# Patient Record
Sex: FEMALE | Race: BLACK OR AFRICAN AMERICAN | NOT HISPANIC OR LATINO | URBAN - METROPOLITAN AREA
[De-identification: names, ages, dates, MRNs, and addresses within clinical notes are randomized per-mention and may not be internally consistent; named-entity substitution may affect disease eponyms.]

---

## 2023-03-12 ENCOUNTER — OFFICE VISIT (OUTPATIENT)
Dept: URGENT CARE | Facility: CLINIC | Age: 43
End: 2023-03-12
Payer: MEDICAID

## 2023-03-12 VITALS
WEIGHT: 121.5 LBS | DIASTOLIC BLOOD PRESSURE: 80 MMHG | SYSTOLIC BLOOD PRESSURE: 116 MMHG | TEMPERATURE: 99 F | BODY MASS INDEX: 22.94 KG/M2 | HEART RATE: 71 BPM | HEIGHT: 61 IN | OXYGEN SATURATION: 98 % | RESPIRATION RATE: 18 BRPM

## 2023-03-12 DIAGNOSIS — B37.0 ORAL CANDIDIASIS: Primary | ICD-10-CM

## 2023-03-12 DIAGNOSIS — B20 HIV DISEASE: ICD-10-CM

## 2023-03-12 PROCEDURE — 99214 OFFICE O/P EST MOD 30 MIN: CPT | Mod: S$GLB,,, | Performed by: NURSE PRACTITIONER

## 2023-03-12 PROCEDURE — 99214 PR OFFICE/OUTPT VISIT, EST, LEVL IV, 30-39 MIN: ICD-10-PCS | Mod: S$GLB,,, | Performed by: NURSE PRACTITIONER

## 2023-03-12 RX ORDER — NYSTATIN 100000 [USP'U]/ML
4 SUSPENSION ORAL 4 TIMES DAILY
Qty: 160 ML | Refills: 0 | Status: SHIPPED | OUTPATIENT
Start: 2023-03-12 | End: 2023-03-22

## 2023-03-12 NOTE — PATIENT INSTRUCTIONS
Oral candidiasis  -     nystatin (MYCOSTATIN) 100,000 unit/mL suspension; Take 4 mLs (400,000 Units total) by mouth 4 (four) times daily. for 10 days  Dispense: 160 mL; Refill: 0    Pt has appt scheduled with PCP in < 2 weeks.  Pt will discuss this diagnosis at that visit to determine if further workup or testing needs to be completed.    Follow up with PCP if symptoms worsen or do not resolve fully.

## 2023-03-12 NOTE — PROGRESS NOTES
"Subjective:       Patient ID: Tayler Vazquez is a 43 y.o. female.    Vitals:  height is 5' 1" (1.549 m) and weight is 55.1 kg (121 lb 7.6 oz). Her temperature is 98.5 °F (36.9 °C). Her blood pressure is 116/80 and her pulse is 71. Her respiration is 18 and oxygen saturation is 98%.     Chief Complaint: Sore Throat      Provider note begins below:    Pt is a 43 year old female with hx HIV presenting with sore throat. Onset of symptoms was 1 month ago and have been intermittent in severity but occur daily.    Sore Throat   This is a new problem. The problem has been unchanged. There has been no fever. The pain is at a severity of 5/10. Associated symptoms include trouble swallowing ("hurts"). Pertinent negatives include no congestion, coughing, diarrhea, ear pain, shortness of breath or vomiting. Treatments tried: home remedies.   Constitution: Negative for chills, fatigue and fever.   HENT:  Positive for sore throat and trouble swallowing ("hurts"). Negative for ear pain, congestion and sinus pain.    Cardiovascular:  Negative for chest pain.   Respiratory:  Negative for chest tightness, cough, sputum production and shortness of breath.    Gastrointestinal:  Negative for nausea, vomiting and diarrhea.   Musculoskeletal:  Negative for muscle ache.     Objective:      Physical Exam   Constitutional: She is oriented to person, place, and time.   HENT:   Head: Normocephalic.   Ears:   Right Ear: Hearing and external ear normal. No no drainage, swelling or tenderness. Tympanic membrane is not erythematous, not retracted and not bulging. No middle ear effusion.   Left Ear: Hearing and external ear normal. No no drainage, swelling or tenderness. Tympanic membrane is not erythematous, not retracted and not bulging.  No middle ear effusion.   Nose: Nose normal. No mucosal edema, rhinorrhea or purulent discharge. Right sinus exhibits no maxillary sinus tenderness and no frontal sinus tenderness. Left sinus exhibits no " maxillary sinus tenderness and no frontal sinus tenderness.   Mouth/Throat: Uvula is midline and mucous membranes are normal. Oral lesions (white patchy areas) present. No uvula swelling. Oropharyngeal exudate (white patchy) present. No posterior oropharyngeal edema or posterior oropharyngeal erythema.   Cardiovascular: Normal rate and regular rhythm.   Pulmonary/Chest: Effort normal and breath sounds normal.   Neurological: She is alert and oriented to person, place, and time.   Skin: Skin is warm and dry.   Nursing note and vitals reviewed.      Assessment:       1. Oral candidiasis    2. HIV disease        Review past clinic notes to determine pt hx and HIV status.      Plan:             Oral candidiasis  -     nystatin (MYCOSTATIN) 100,000 unit/mL suspension; Take 4 mLs (400,000 Units total) by mouth 4 (four) times daily. for 10 days  Dispense: 160 mL; Refill: 0    HIV disease    Candidiasis r/t HIV infection.      Patient Instructions   Oral candidiasis  -     nystatin (MYCOSTATIN) 100,000 unit/mL suspension; Take 4 mLs (400,000 Units total) by mouth 4 (four) times daily. for 10 days  Dispense: 160 mL; Refill: 0    Pt has appt scheduled with PCP in < 2 weeks.  Pt will discuss this diagnosis at that visit to determine if further workup or testing needs to be completed.    Follow up with PCP if symptoms worsen or do not resolve fully.